# Patient Record
Sex: MALE | Race: WHITE | NOT HISPANIC OR LATINO
[De-identification: names, ages, dates, MRNs, and addresses within clinical notes are randomized per-mention and may not be internally consistent; named-entity substitution may affect disease eponyms.]

---

## 2021-10-27 PROBLEM — Z00.00 ENCOUNTER FOR PREVENTIVE HEALTH EXAMINATION: Status: ACTIVE | Noted: 2021-10-27

## 2021-11-02 ENCOUNTER — NON-APPOINTMENT (OUTPATIENT)
Age: 56
End: 2021-11-02

## 2021-11-02 ENCOUNTER — APPOINTMENT (OUTPATIENT)
Dept: UROLOGY | Facility: CLINIC | Age: 56
End: 2021-11-02
Payer: COMMERCIAL

## 2021-11-02 VITALS
HEIGHT: 72 IN | TEMPERATURE: 96.7 F | WEIGHT: 220 LBS | DIASTOLIC BLOOD PRESSURE: 87 MMHG | SYSTOLIC BLOOD PRESSURE: 129 MMHG | BODY MASS INDEX: 29.8 KG/M2 | HEART RATE: 98 BPM

## 2021-11-02 DIAGNOSIS — Z78.9 OTHER SPECIFIED HEALTH STATUS: ICD-10-CM

## 2021-11-02 DIAGNOSIS — R10.2 PELVIC AND PERINEAL PAIN: ICD-10-CM

## 2021-11-02 PROCEDURE — 99204 OFFICE O/P NEW MOD 45 MIN: CPT

## 2021-11-02 NOTE — HISTORY OF PRESENT ILLNESS
[FreeTextEntry1] : 55 year old man with no significant past medical history.\par \par Reports 3-4 months of worsening erections. Had strong erections previously. Erections not as strong. No pain with erections. Some difficulty obtaining and maintaining.  No ejaculatory abnormality. Has gained weight in last few years. No cardiac history or diabetes. Has had increased anxiety and stress related to pelvic pressure and penile swelling in same time frame.\par \par Regarding pelvic pressure and penile swelling, also occurred intermittently over the last few months. He has noticed what he describes as a blood vessel that is swollen just below the glans. It gets swollen with erections and then goes down an hour after erection subsides. No pain. \par \par He also has had intermittent pelvic pressure over the same time frame. No pain, no fevers, chills, bulging. No relation to physical activity. Pain located in groins bilaterally, in particular on left side. \par \par Denies urinary symptoms. No family history of  issues or malignancy. Had prior PSA that was normal per patient.

## 2021-11-02 NOTE — ASSESSMENT
[FreeTextEntry1] : I had a lengthy discussion with the patient regarding the various causes of erectile dysfunction.\par \par We have reviewed his past medical history, surgical history, and medications and discussed its effects on erectile function. We reviewed laboratory results that are pertinent to erectile function. I have stressed the importance on focusing on overall health in order to preserve erectile function, as it is merely a mirror image of overall health, physical and emotional.\par \par Treatment wise, we specifically discussed oral PDE5 inhibitors, vacuum erection devices, transurethral PGE1, intercavernosal injections, and penile prosthesis implantation. We finally discussed the cardiovascular implications (endothelial dysfunction) of a diagnosis of erectile dysfunction in that there is a higher risk of underlying, sometimes clinically occult cardiovascular disease, which may be unmasked with a stress evaluation. We discussed that low testosterone can also be a cause of weight gain and ED.\par  \par We discussed PSA screening per [employer]UA guidelines, which does not recommend routine PSA screening in men under 40 years of age, recommends PSA screening in men aged 40 to 54 only if  or family history of metastatic adenocarcinoma, recommends shared decision making in men aged 55-69, recommends against routine PSA screening in men aged 70+. Time interval for PSA screening is every 2 years if PSA values are normal and patient is not high risk.\par \par Regarding pelvic pressure and focal penile swelling, the causes are unclear. There was no noted abnormality on exam today. I asked patient to take photograph of penile swelling the next time that it occurs. We discussed management strategies for pelvic discomfort, including anti-inflammatories and pelvic physical therapy.\par  - PSA, testosterone\par  - Encouraged healthy diet and regular exercise\par  - Patient to see PCP for cardiac evaluation\par  - Patient not interested in PDE5 inhibitor at this point\par  - Will photograph next occurrence of penile swelling.\par

## 2021-11-02 NOTE — PHYSICAL EXAM
[General Appearance - Well Developed] : well developed [General Appearance - Well Nourished] : well nourished [Normal Appearance] : normal appearance [Well Groomed] : well groomed [General Appearance - In No Acute Distress] : no acute distress [Edema] : no peripheral edema [Respiration, Rhythm And Depth] : normal respiratory rhythm and effort [Exaggerated Use Of Accessory Muscles For Inspiration] : no accessory muscle use [Abdomen Soft] : soft [Abdomen Tenderness] : non-tender [Costovertebral Angle Tenderness] : no ~M costovertebral angle tenderness [Urethral Meatus] : meatus normal [Penis Abnormality] : normal uncircumcised penis [Urinary Bladder Findings] : the bladder was normal on palpation [Scrotum] : the scrotum was normal [Testes Mass (___cm)] : there were no testicular masses [No Prostate Nodules] : no prostate nodules [Normal Station and Gait] : the gait and station were normal for the patient's age [] : no rash [No Focal Deficits] : no focal deficits [Oriented To Time, Place, And Person] : oriented to person, place, and time [Affect] : the affect was normal [Mood] : the mood was normal [Not Anxious] : not anxious [No Palpable Adenopathy] : no palpable adenopathy

## 2021-11-04 LAB
PSA FREE FLD-MCNC: 17 %
PSA FREE SERPL-MCNC: 0.2 NG/ML
PSA SERPL-MCNC: 1.17 NG/ML
TESTOST SERPL-MCNC: 379 NG/DL

## 2021-11-24 ENCOUNTER — APPOINTMENT (OUTPATIENT)
Dept: UROLOGY | Facility: CLINIC | Age: 56
End: 2021-11-24

## 2022-01-04 ENCOUNTER — APPOINTMENT (OUTPATIENT)
Dept: AFTER HOURS CARE | Facility: EMERGENCY ROOM | Age: 57
End: 2022-01-04

## 2022-03-31 ENCOUNTER — APPOINTMENT (OUTPATIENT)
Dept: GASTROENTEROLOGY | Facility: CLINIC | Age: 57
End: 2022-03-31

## 2022-04-20 ENCOUNTER — APPOINTMENT (OUTPATIENT)
Dept: UROLOGY | Facility: CLINIC | Age: 57
End: 2022-04-20
Payer: COMMERCIAL

## 2022-04-20 VITALS
BODY MASS INDEX: 29.12 KG/M2 | HEART RATE: 85 BPM | TEMPERATURE: 97.2 F | DIASTOLIC BLOOD PRESSURE: 86 MMHG | SYSTOLIC BLOOD PRESSURE: 127 MMHG | HEIGHT: 72 IN | WEIGHT: 215 LBS | RESPIRATION RATE: 18 BRPM

## 2022-04-20 DIAGNOSIS — N52.9 MALE ERECTILE DYSFUNCTION, UNSPECIFIED: ICD-10-CM

## 2022-04-20 LAB
BILIRUB UR QL STRIP: NORMAL
CLARITY UR: CLEAR
COLLECTION METHOD: NORMAL
GLUCOSE UR-MCNC: NORMAL
HCG UR QL: 0.2 EU/DL
HGB UR QL STRIP.AUTO: NORMAL
KETONES UR-MCNC: NORMAL
LEUKOCYTE ESTERASE UR QL STRIP: NORMAL
NITRITE UR QL STRIP: NORMAL
PH UR STRIP: 8.5
PROT UR STRIP-MCNC: NORMAL
SP GR UR STRIP: 1.02

## 2022-04-20 PROCEDURE — 99214 OFFICE O/P EST MOD 30 MIN: CPT | Mod: 25

## 2022-04-20 PROCEDURE — 81003 URINALYSIS AUTO W/O SCOPE: CPT | Mod: QW

## 2022-04-20 RX ORDER — SILDENAFIL 25 MG/1
25 TABLET ORAL
Qty: 10 | Refills: 3 | Status: ACTIVE | COMMUNITY
Start: 2022-04-20 | End: 1900-01-01

## 2022-04-20 NOTE — ASSESSMENT
[FreeTextEntry1] : I had a lengthy discussion with the patient regarding the various causes of erectile dysfunction.\par \par We have reviewed his past medical history, surgical history, and medications and discussed its effects on erectile function. We reviewed laboratory results that are pertinent to erectile function. I have stressed the importance on focusing on overall health in order to preserve erectile function, as it is merely a mirror image of overall health, physical and emotional.\par \par Treatment wise, we specifically discussed oral PDE5 inhibitors, vacuum erection devices, transurethral PGE1, intercavernosal injections, and penile prosthesis implantation. We finally discussed the cardiovascular implications (endothelial dysfunction) of a diagnosis of erectile dysfunction in that there is a higher risk of underlying, sometimes clinically occult cardiovascular disease, which may be unmasked with a stress evaluation. We discussed that low testosterone can also be a cause of weight gain and ED.\par \par  - Sildenafil 25 mg prn prescribed\par  - F/U in 6 months for PSA screening\par

## 2022-04-20 NOTE — HISTORY OF PRESENT ILLNESS
[FreeTextEntry1] : 11/2/21: 55 year old man with no significant past medical history.\par \par Reports 3-4 months of worsening erections. Had strong erections previously. Erections not as strong. No pain with erections. Some difficulty obtaining and maintaining.  No ejaculatory abnormality. Has gained weight in last few years. No cardiac history or diabetes. Has had increased anxiety and stress related to pelvic pressure and penile swelling in same time frame.\par \par Regarding pelvic pressure and penile swelling, also occurred intermittently over the last few months. He has noticed what he describes as a blood vessel that is swollen just below the glans. It gets swollen with erections and then goes down an hour after erection subsides. No pain. \par \par He also has had intermittent pelvic pressure over the same time frame. No pain, no fevers, chills, bulging. No relation to physical activity. Pain located in groins bilaterally, in particular on left side. \par \par Denies urinary symptoms. No family history of  issues or malignancy. Had prior PSA that was normal per patient.\par \par PSA 11/2/21--1.17\par Testosterone 11/2/21--379\par \par 4/20/22: Pelvic pressure and penile swelling have resolved. He had one episode of left groin pain after ejaculation that is improving. He continues to have ED, primarily difficulty maintaining erections. He has lost weight and is exercising more than prior.

## 2022-04-28 ENCOUNTER — NON-APPOINTMENT (OUTPATIENT)
Age: 57
End: 2022-04-28